# Patient Record
Sex: FEMALE | Race: WHITE
[De-identification: names, ages, dates, MRNs, and addresses within clinical notes are randomized per-mention and may not be internally consistent; named-entity substitution may affect disease eponyms.]

---

## 2018-01-07 ENCOUNTER — HOSPITAL ENCOUNTER (EMERGENCY)
Dept: HOSPITAL 17 - NEPE | Age: 28
Discharge: HOME | End: 2018-01-07
Payer: COMMERCIAL

## 2018-01-07 VITALS — BODY MASS INDEX: 25.59 KG/M2 | WEIGHT: 149.91 LBS | HEIGHT: 64 IN

## 2018-01-07 VITALS
TEMPERATURE: 98.5 F | RESPIRATION RATE: 16 BRPM | OXYGEN SATURATION: 99 % | SYSTOLIC BLOOD PRESSURE: 138 MMHG | HEART RATE: 89 BPM | DIASTOLIC BLOOD PRESSURE: 94 MMHG

## 2018-01-07 VITALS
RESPIRATION RATE: 18 BRPM | OXYGEN SATURATION: 99 % | HEART RATE: 83 BPM | SYSTOLIC BLOOD PRESSURE: 147 MMHG | DIASTOLIC BLOOD PRESSURE: 94 MMHG

## 2018-01-07 VITALS — SYSTOLIC BLOOD PRESSURE: 130 MMHG | DIASTOLIC BLOOD PRESSURE: 87 MMHG

## 2018-01-07 DIAGNOSIS — Z79.3: ICD-10-CM

## 2018-01-07 DIAGNOSIS — F17.200: ICD-10-CM

## 2018-01-07 DIAGNOSIS — M79.661: Primary | ICD-10-CM

## 2018-01-07 DIAGNOSIS — F32.9: ICD-10-CM

## 2018-01-07 PROCEDURE — 80053 COMPREHEN METABOLIC PANEL: CPT

## 2018-01-07 PROCEDURE — 85025 COMPLETE CBC W/AUTO DIFF WBC: CPT

## 2018-01-07 PROCEDURE — 73590 X-RAY EXAM OF LOWER LEG: CPT

## 2018-01-07 PROCEDURE — 96372 THER/PROPH/DIAG INJ SC/IM: CPT

## 2018-01-07 PROCEDURE — 99284 EMERGENCY DEPT VISIT MOD MDM: CPT

## 2018-01-07 PROCEDURE — 93971 EXTREMITY STUDY: CPT

## 2018-01-07 PROCEDURE — 84702 CHORIONIC GONADOTROPIN TEST: CPT

## 2018-01-07 PROCEDURE — 85379 FIBRIN DEGRADATION QUANT: CPT

## 2018-01-07 NOTE — PD
HPI


Chief Complaint:  Abnormal Results


Time Seen by Provider:  19:32


Travel History


International Travel<30 days:  No


Contact w/Intl Traveler<30days:  No


Traveled to known affect area:  No





History of Present Illness


HPI


Examined in the presence of female nurse.  This is 27-year-old female who is 

transferred here from [facility] for ultrasound of the right lower 

extremity.  The patient reports that she woke up yesterday morning with pain to 

the posterior proximal right calf and knee.  She describes it as a "charley 

horse" type pain that is constant, worse when walking.  She does not recall any 

injuries but she does note that 2 days ago she had a lot of alcohol to drink 

and so she may have fallen but doesn't remember specifically.  She denies any 

history of DVT personally.  Denies any recent travel.  She does report 

exploratory laparoscopic surgery in November 2017 for endometriosis treatment.  

She denies chest pain, shortness of breath, thigh pain, back pain.  She tried 

taking ibuprofen with persistent pain.





PFSH


Past Medical History


Depression:  Yes


Diminished Hearing:  No


Immunizations Current:  Yes


Tetanus Vaccination:  Unknown


Influenza Vaccination:  No


Pregnant?:  Not Pregnant


LMP:  12/15/17


Ovarian Cysts:  Yes





Past Surgical History


Abdominal Surgery:  Yes (EXP FOR ENDOMETRIOSIS)


Other Surgery:  Yes (wisdom teeth)





Social History


Alcohol Use:  Yes (socially)


Tobacco Use:  Yes (1/2 ppd)


Substance Use:  No





Allergies-Medications


(Allergen,Severity, Reaction):  


Coded Allergies:  


     No Known Allergies (Unverified  Adverse Reaction, Unknown, 1/7/18)


Reported Meds & Prescriptions





Reported Meds & Active Scripts


Active


Ibuprofen 800 Mg Tab 800 Mg PO Q6HR PRN


Baclofen 10 Mg Tab 10 Mg PO Q8HR 5 Days


Reported


Depo-Provera Inj (Medroxyprogesterone Inj) 150 Mg/Ml Inj 150 Mg IM Q90D


Effexor (Venlafaxine HCl) 75 Mg Tab 75 Mg PO DAILY


[birth control] 1 Tab 1 Tab PO HS








Review of Systems


Except as stated in HPI:  all other systems reviewed are Neg





Physical Exam


Narrative


GENERAL: Well-developed well-nourished female in no acute distress


SKIN: Warm and dry.


HEAD: Atraumatic. Normocephalic. 


EYES: Pupils equal and round. No scleral icterus. No injection or drainage. 


ENT: No nasal bleeding or discharge.  Mucous membranes pink and moist.


NECK: Trachea midline. No JVD. 


CARDIOVASCULAR: Regular rate and rhythm.  No murmur appreciated.


RESPIRATORY: No accessory muscle use. Clear to auscultation. Breath sounds 

equal bilaterally. 


GASTROINTESTINAL: Abdomen soft, non-tender, nondistended. Hepatic and splenic 

margins not palpable. 


MUSCULOSKELETAL: No obvious deformities.  There is no lower extremity edema.  

There is tenderness to palpation of the posterior right calf muscle.  Positive 

Homans on the right leg.  Achilles tendon is intact.  The patient maintains 

full active range of motion of the lower extremities.  2+ dorsalis pedis and 

posterior tibial pulses.


NEUROLOGICAL: Awake and alert. No obvious cranial nerve deficits.  Motor 

grossly within normal limits. Normal speech.


PSYCHIATRIC: Appropriate mood and affect; insight and judgment normal.





Data


Data


Last Documented VS





Vital Signs








  Date Time  Temp Pulse Resp B/P (MAP) Pulse Ox O2 Delivery O2 Flow Rate FiO2


 


1/7/18 19:38  83 18 147/94 (111) 99 Room Air  


 


1/7/18 19:23 98.5       








Orders





 Orders


Us Leg Venous Doppler (1/7/18 19:33)


Acetaminophen (Tylenol) (1/7/18 19:45)


Ketorolac Inj (Toradol Inj) (1/7/18 20:30)


Ed Discharge Order (1/7/18 20:23)








[facility]


Medical Decision Making


Medical Screen Exam Complete:  Yes


Emergency Medical Condition:  Yes


Medical Record Reviewed:  Yes


Differential Diagnosis


Muscle cramp, muscle strain, DVT


Narrative Course


27-year-old female has been expressing right calf pain since yesterday.  

Transferred here from [facility] for ultrasound of the right leg.  I 

reviewed her records from her visit this afternoon at [facility].  CBC was 

unremarkable, CMP revealed a total protein of 6.3, calcium 8.1.  Beta hCG was 

negative.  Tibia-fibula x-ray was normal.  D-dimer was 0.5.  I offered the 

patient muscle relaxant medication however she declines as she will likely have 

to drive herself home tonight.





The patient's right leg ultrasound is negative for DVT.  Suspect muscular pain.

  The patient be given a dose of Toradol here and discharged with a short 

course of ibuprofen and baclofen.





Diagnosis





 Primary Impression:  


 Right calf pain





***Additional Instructions:  


Cool compresses several times a day 10 minutes at a time.  Medication as 

needed.  Take ibuprofen with meals.  Do not drive or drink alcohol and taking 

baclofen.  Follow up with primary care physician as needed and return for any 

emergent medical conditions.


***Med/Other Pt SpecificInfo:  Prescription(s) given


Scripts


Ibuprofen (Ibuprofen) 800 Mg Tab


800 MG PO Q6HR Y for PAIN, #40 TAB 0 Refills


   Prov: Maureen Mcdowell MD         1/7/18 


Baclofen (Baclofen) 10 Mg Tab


10 MG PO Q8HR for 5 Days, TAB 0 Refills


   Prov: Maureen Mcdowell MD         1/7/18


Disposition:  01 DISCHARGE HOME


Condition:  Stable











Jesse Smith Jan 7, 2018 19:45

## 2018-01-07 NOTE — RADRPT
EXAM DATE/TIME:  01/07/2018 19:58 

 

[facility] COMPARISON:     

No previous studies available for comparison.

        

 

 

INDICATIONS :                

Right leg pain. 

            

 

MEDICAL HISTORY :           

Endometriosis. Ovarian cysts. Depression. 

 

SURGICAL HISTORY :         

Exploratory surgery for endometriosis. [place] teeth. 

 

ENCOUNTER:     

Initial

 

ACUITY:     

2 day

 

PAIN SCORE:      

8/10

 

LOCATION:      

Right  leg.

            

                      

 

TECHNIQUE:     

Venous ultrasound of the leg was performed from the inguinal ligament to the proximal calf.  Real-ilda
e, color Doppler and spectral tracing, compression and augmentation techniques were used.  

 

FINDINGS:     

There is normal compressibility of the deep venous system from the inguinal region to the proximal ca
lf.  No echogenic clot is seen in the lumen of the common femoral, femoral, popliteal, and posterior 
tibial veins.  There is a normal response of the venous system to proximal and distal augmentation an
d respiration.  

 

CONCLUSION:     No evidence of DVT

 

 

 

 Aj Bender MD on January 07, 2018 at 20:19           

Board Certified Radiologist.

 This report was verified electronically.